# Patient Record
Sex: FEMALE | Race: AMERICAN INDIAN OR ALASKA NATIVE
[De-identification: names, ages, dates, MRNs, and addresses within clinical notes are randomized per-mention and may not be internally consistent; named-entity substitution may affect disease eponyms.]

---

## 2020-12-17 ENCOUNTER — HOSPITAL ENCOUNTER (EMERGENCY)
Dept: HOSPITAL 5 - ED | Age: 20
Discharge: LEFT BEFORE DISCHARGE | End: 2020-12-17
Payer: COMMERCIAL

## 2020-12-17 VITALS — DIASTOLIC BLOOD PRESSURE: 66 MMHG | SYSTOLIC BLOOD PRESSURE: 133 MMHG

## 2020-12-17 DIAGNOSIS — M25.511: Primary | ICD-10-CM

## 2020-12-17 DIAGNOSIS — Z53.21: ICD-10-CM

## 2020-12-17 NOTE — XRAY REPORT
EXAMINATION: Right shoulder radiograph, 2 views, 12/17/2020



CLINICAL INFORMATION: Right shoulder pain



COMPARISON: None.



FINDINGS: There is no evidence of acute fracture or dislocation of the right shoulder. No significant
 bony degenerative changes are noted.



Signer Name: Ruyb Paris MD 

Signed: 12/17/2020 3:19 AM

Workstation Name: [id number]

## 2021-04-28 ENCOUNTER — HOSPITAL ENCOUNTER (EMERGENCY)
Dept: HOSPITAL 5 - ED | Age: 21
Discharge: HOME | End: 2021-04-28
Payer: COMMERCIAL

## 2021-04-28 VITALS — SYSTOLIC BLOOD PRESSURE: 133 MMHG | DIASTOLIC BLOOD PRESSURE: 79 MMHG

## 2021-04-28 DIAGNOSIS — Z79.899: ICD-10-CM

## 2021-04-28 DIAGNOSIS — M94.0: Primary | ICD-10-CM

## 2021-04-28 LAB
ALBUMIN SERPL-MCNC: 3.6 G/DL (ref 3.9–5)
ALT SERPL-CCNC: 10 UNITS/L (ref 7–56)
BASOPHILS # (AUTO): 0 K/MM3 (ref 0–0.1)
BASOPHILS NFR BLD AUTO: 0.3 % (ref 0–1.8)
BUN SERPL-MCNC: 9 MG/DL (ref 7–17)
BUN/CREAT SERPL: 18 %
CALCIUM SERPL-MCNC: 8.7 MG/DL (ref 8.4–10.2)
EOSINOPHIL # BLD AUTO: 0.3 K/MM3 (ref 0–0.4)
EOSINOPHIL NFR BLD AUTO: 4 % (ref 0–4.3)
HCT VFR BLD CALC: 36.6 % (ref 30.3–42.9)
HEMOLYSIS INDEX: 6
HGB BLD-MCNC: 11.9 GM/DL (ref 10.1–14.3)
LYMPHOCYTES # BLD AUTO: 1.6 K/MM3 (ref 1.2–5.4)
LYMPHOCYTES NFR BLD AUTO: 22.5 % (ref 13.4–35)
MCHC RBC AUTO-ENTMCNC: 33 % (ref 30–34)
MCV RBC AUTO: 89 FL (ref 79–97)
MONOCYTES # (AUTO): 0.5 K/MM3 (ref 0–0.8)
MONOCYTES % (AUTO): 6.5 % (ref 0–7.3)
PLATELET # BLD: 393 K/MM3 (ref 140–440)
RBC # BLD AUTO: 4.14 M/MM3 (ref 3.65–5.03)

## 2021-04-28 PROCEDURE — 84484 ASSAY OF TROPONIN QUANT: CPT

## 2021-04-28 PROCEDURE — 80053 COMPREHEN METABOLIC PANEL: CPT

## 2021-04-28 PROCEDURE — 36415 COLL VENOUS BLD VENIPUNCTURE: CPT

## 2021-04-28 PROCEDURE — 85025 COMPLETE CBC W/AUTO DIFF WBC: CPT

## 2021-04-28 PROCEDURE — 71046 X-RAY EXAM CHEST 2 VIEWS: CPT

## 2021-04-28 PROCEDURE — 93005 ELECTROCARDIOGRAM TRACING: CPT

## 2021-04-28 NOTE — XRAY REPORT
CHEST PA AND LATERAL VIEWS



INDICATION: 

right parasternal chest pain.



COMPARISON: 

None.



FINDINGS:



Support devices: None.

Heart: Within normal limits. 

Lungs/Pleura: No acute pulmonary or pleural findings.  







IMPRESSION:

1. No acute findings.



Signer Name: Corbin Barnes MD 

Signed: 4/28/2021 10:33 AM

Workstation Name: [id number]

## 2021-04-28 NOTE — EMERGENCY DEPARTMENT REPORT
ED Chest Pain HPI





- General


Chief Complaint: Chest Pain


Stated Complaint: CHEST PAIN, SARIKA


Time Seen by Provider: 04/28/21 09:33


Source: patient


Mode of arrival: Ambulatory


Limitations: No Limitations





- History of Present Illness


Initial Comments: 





20-year-old -American female patient presents with complaints of right 

sided chest pain starting 3 days ago.  She denies any shortness of breath and 

states the pain worsens with deep inhalation.  She rates the pain as a 10/10 in 

severity.  Pain also worsens with touch to the chest.  Ibuprofen does relieve 

the pain.  Patient does have children and does frequent heavy lifting.  Pain is 

sharp in nature.  She denies any shortness of breath, cough, hemoptysis, leg 

pain/swelling, recent long travel, or history of cancer.  No family history of 

heart disease per patient.





- Related Data


                                  Previous Rx's











 Medication  Instructions  Recorded  Last Taken  Type


 


Naproxen 500 mg PO BID PRN #20 tablet 04/28/21 Unknown Rx


 


methocarbamoL [Methocarbamol] 750 mg PO TID PRN #15 tablet 04/28/21 Unknown Rx


 


predniSONE [Deltasone] 20 mg PO BID #4 tab 04/28/21 Unknown Rx











                                    Allergies











Allergy/AdvReac Type Severity Reaction Status Date / Time


 


No Known Allergies Allergy   Verified 04/28/21 09:34














Heart Score





- HEART Score


History: Slightly suspicious


EKG: Normal


Age: < 45


Risk factors: No known risk factors


Troponin: < normal limit


HEART Score: 0





- EKG Read Time


Time EKG Completed: 09:38


EKG Read Time: 09:40





ED Review of Systems


ROS: 


Stated complaint: CHEST PAIN, SARIKA


Other details as noted in HPI





Constitutional: denies: chills, diaphoresis, fever, malaise, weakness


Respiratory: denies: cough, shortness of breath


Cardiovascular: chest pain.  denies: palpitations, edema, syncope


Endocrine: denies: excessive sweating


Gastrointestinal: denies: abdominal pain


Musculoskeletal: denies: back pain


Neurological: denies: headache


Hematological/Lymphatic: denies: swollen glands





ED Past Medical Hx





- Past Medical History


Previous Medical History?: No





- Surgical History


Past Surgical History?: No





- Social History


Smoking Status: Never Smoker


Substance Use Type: None





- Medications


Home Medications: 


                                Home Medications











 Medication  Instructions  Recorded  Confirmed  Last Taken  Type


 


Naproxen 500 mg PO BID PRN #20 tablet 04/28/21  Unknown Rx


 


methocarbamoL [Methocarbamol] 750 mg PO TID PRN #15 tablet 04/28/21  Unknown Rx


 


predniSONE [Deltasone] 20 mg PO BID #4 tab 04/28/21  Unknown Rx














ED Physical Exam





- General


Limitations: No Limitations


General appearance: alert, in no apparent distress, obese





- Head


Head exam: Present: atraumatic, normocephalic





- Eye


Eye exam: Present: normal appearance





- Neck


Neck exam: Present: normal inspection, full ROM





- Respiratory


Respiratory exam: Present: chest wall tenderness (Tenderness to palpation noted 

to the right parasternal costochondral area without bruising or obvious 

deformity).  Absent: respiratory distress





- Cardiovascular


Cardiovascular Exam: Present: regular rate, normal rhythm.  Absent: systolic 

murmur, diastolic murmur, rubs, gallop





- Extremities Exam


Extremities exam: Absent: calf tenderness (No swelling or pain noted to legs 

bilaterally)





- Back Exam


Back exam: Present: full ROM





- Neurological Exam


Neurological exam: Present: alert, oriented X3, normal gait





- Psychiatric


Psychiatric exam: Present: normal affect, normal mood





- Skin


Skin exam: Present: warm, dry, intact, normal color.  Absent: rash





ED Course


                                   Vital Signs











  04/28/21





  09:29


 


Temperature 98.7 F


 


Pulse Rate 88


 


Respiratory 18





Rate 


 


Blood Pressure 133/79


 


O2 Sat by Pulse 99





Oximetry 














ED Medical Decision Making





- Lab Data


Result diagrams: 


                                 04/28/21 09:41





                                 04/28/21 09:40








                                   Lab Results











  04/28/21 04/28/21 Range/Units





  09:40 09:41 


 


WBC   7.2  (4.5-11.0)  K/mm3


 


RBC   4.14  (3.65-5.03)  M/mm3


 


Hgb   11.9  (10.1-14.3)  gm/dl


 


Hct   36.6  (30.3-42.9)  %


 


MCV   89  (79-97)  fl


 


MCH   29  (28-32)  pg


 


MCHC   33  (30-34)  %


 


RDW   13.5  (13.2-15.2)  %


 


Plt Count   393  (140-440)  K/mm3


 


Lymph % (Auto)   22.5  (13.4-35.0)  %


 


Mono % (Auto)   6.5  (0.0-7.3)  %


 


Eos % (Auto)   4.0  (0.0-4.3)  %


 


Baso % (Auto)   0.3  (0.0-1.8)  %


 


Lymph # (Auto)   1.6  (1.2-5.4)  K/mm3


 


Mono # (Auto)   0.5  (0.0-0.8)  K/mm3


 


Eos # (Auto)   0.3  (0.0-0.4)  K/mm3


 


Baso # (Auto)   0.0  (0.0-0.1)  K/mm3


 


Seg Neutrophils %   66.7  (40.0-70.0)  %


 


Seg Neutrophils #   4.8  (1.8-7.7)  K/mm3


 


Sodium  138   (137-145)  mmol/L


 


Potassium  3.8   (3.6-5.0)  mmol/L


 


Chloride  104.5   ()  mmol/L


 


Carbon Dioxide  25   (22-30)  mmol/L


 


Anion Gap  12   mmol/L


 


BUN  9   (7-17)  mg/dL


 


Creatinine  0.5 L   (0.6-1.2)  mg/dL


 


Estimated GFR  > 60   ml/min


 


BUN/Creatinine Ratio  18   %


 


Glucose  84   ()  mg/dL


 


Calcium  8.7   (8.4-10.2)  mg/dL


 


Total Bilirubin  0.40   (0.1-1.2)  mg/dL


 


AST  11   (5-40)  units/L


 


ALT  10   (7-56)  units/L


 


Alkaline Phosphatase  87   ()  units/L


 


Troponin T  < 0.010   (0.00-0.029)  ng/mL


 


Total Protein  6.8   (6.3-8.2)  g/dL


 


Albumin  3.6 L   (3.9-5)  g/dL


 


Albumin/Globulin Ratio  1.1   %














- EKG Data


EKG shows normal: sinus rhythm


Rate: normal





- EKG Data


Interpretation: normal EKG





- Radiology Data


Radiology results: report reviewed





CHEST PA AND LATERAL VIEWS  


 


 INDICATION:   


 right parasternal chest pain.  


 


 COMPARISON:   


 None.  


 


 FINDINGS:  


 


 Support devices: None.  


 Heart: Within normal limits.   


 Lungs/Pleura: No acute pulmonary or pleural findings.    


 


 


 


 IMPRESSION:  


 1. No acute findings.  





- Medical Decision Making








31-year-old -American female patient presents with complaints of left 

hand pain after a FOOSH injury today.  She rates her pain as a 10/10 in severity

 and denies trying any OTC medications prior to arrival.  She reports difficulty

 moving the thumb, but denies any numbness/tingling or weakness in her hand.





Heart score = 0.  Labs are normal.  Chest x-ray is normal.  Will treat for 

costochondritis with NSAIDs and muscle relaxers.  Recommend icing also.  Patient

 to follow-up primary care doctor in 3 days.  Her vitals are normal, she is 

well-appearing, she is stable for discharge home.  Strict return precautions 

were discussed in great detail with patient who verbalizes understanding.


Critical care attestation.: 


If time is entered above; I have spent that time in minutes in the direct care 

of this critically ill patient, excluding procedure time.








ED Disposition


Clinical Impression: 


 Costochondritis, acute





Disposition: DC-01 TO HOME OR SELFCARE


Is pt being admited?: No


Condition: Stable


Instructions:  Costochondritis


Prescriptions: 


predniSONE [Deltasone] 20 mg PO BID #4 tab


methocarbamoL [Methocarbamol] 750 mg PO TID PRN #15 tablet


 PRN Reason: muscle spasm/tightness


Naproxen 500 mg PO BID PRN #20 tablet


 PRN Reason: pain


Referrals: 


[facility],MD [Primary Care Provider] - 3-5 Days


[facility] [Provider Group] - 3-5 Days


Forms:  Work/School Release Form(ED)

## 2021-04-29 NOTE — ELECTROCARDIOGRAPH REPORT
[facility]

                                       

Test Date:    2021               Test Time:    09:37:24

Pat Name:     JOSI BRUMFIELD             Department:   

Patient ID:   [id number]          Room:          

Gender:       F                        Technician:   MABEL

:          [id number]               Requested By: VANESSA ADAMES

Order Number: [id number]              Reading MD:   Cris Corey

                                 Measurements

Intervals                              Axis          

Rate:         81                       P:            37

TX:           161                      QRS:          43

QRSD:         85                       T:            27

QT:           377                                    

QTc:          437                                    

                           Interpretive Statements

Sinus rhythm

Low voltage, precordial leads

No previous ECG available for comparison

Electronically Signed On 2021 10:48:33 EDT by Cris Corey